# Patient Record
Sex: FEMALE | Race: WHITE | NOT HISPANIC OR LATINO | Employment: OTHER | URBAN - NONMETROPOLITAN AREA
[De-identification: names, ages, dates, MRNs, and addresses within clinical notes are randomized per-mention and may not be internally consistent; named-entity substitution may affect disease eponyms.]

---

## 2023-07-17 ENCOUNTER — OFFICE VISIT (OUTPATIENT)
Dept: PRIMARY CARE | Facility: CLINIC | Age: 73
End: 2023-07-17
Payer: MEDICARE

## 2023-07-17 VITALS
DIASTOLIC BLOOD PRESSURE: 72 MMHG | SYSTOLIC BLOOD PRESSURE: 114 MMHG | WEIGHT: 146 LBS | HEART RATE: 62 BPM | OXYGEN SATURATION: 97 % | BODY MASS INDEX: 23.57 KG/M2

## 2023-07-17 DIAGNOSIS — S76.012A STRAIN OF GLUTEUS MEDIUS OF LEFT LOWER EXTREMITY, INITIAL ENCOUNTER: Primary | ICD-10-CM

## 2023-07-17 DIAGNOSIS — M25.552 LEFT HIP PAIN: ICD-10-CM

## 2023-07-17 PROCEDURE — 99214 OFFICE O/P EST MOD 30 MIN: CPT | Performed by: FAMILY MEDICINE

## 2023-07-17 PROCEDURE — 1036F TOBACCO NON-USER: CPT | Performed by: FAMILY MEDICINE

## 2023-07-17 PROCEDURE — 1160F RVW MEDS BY RX/DR IN RCRD: CPT | Performed by: FAMILY MEDICINE

## 2023-07-17 PROCEDURE — 1159F MED LIST DOCD IN RCRD: CPT | Performed by: FAMILY MEDICINE

## 2023-07-17 PROCEDURE — 1157F ADVNC CARE PLAN IN RCRD: CPT | Performed by: FAMILY MEDICINE

## 2023-07-17 PROCEDURE — 1125F AMNT PAIN NOTED PAIN PRSNT: CPT | Performed by: FAMILY MEDICINE

## 2023-07-17 RX ORDER — DILTIAZEM HYDROCHLORIDE 120 MG/1
120 CAPSULE, COATED, EXTENDED RELEASE ORAL DAILY
COMMUNITY
Start: 2022-07-26

## 2023-07-17 RX ORDER — LOSARTAN POTASSIUM 50 MG/1
1 TABLET ORAL DAILY
COMMUNITY
Start: 2022-11-10 | End: 2023-11-07 | Stop reason: SDUPTHER

## 2023-07-17 ASSESSMENT — PATIENT HEALTH QUESTIONNAIRE - PHQ9
1. LITTLE INTEREST OR PLEASURE IN DOING THINGS: NOT AT ALL
SUM OF ALL RESPONSES TO PHQ9 QUESTIONS 1 AND 2: 0
2. FEELING DOWN, DEPRESSED OR HOPELESS: NOT AT ALL

## 2023-07-17 NOTE — PROGRESS NOTES
Subjective   Patient ID: Dawna Santoyo is a 73 y.o. female who presents for LT hip pain (For 1 month).    HPI   Hip pain  Has had pain off and on over yrs  Ok walking  Stairs a problem   No Injury but in the process of moving and walking up and down steps 15-20 x day and driving back and forth between [place]   Has most pain while sitting particularly driving prolonged sitting  Also hurts going up steps    Review of Systems    Objective   /72   Pulse 62   Wt 66.2 kg (146 lb)   SpO2 97%   BMI 23.57 kg/m²     Physical Exam  A and O x 3 NAD   Negative straight leg raise can go up past 90 degrees without pain  Pain mid gluteus area to lower aspect / upper inner thigh  + pain resisted ext inner rotation hip    Pain passive ext rotation    Assessment/Plan   Problem List Items Addressed This Visit       Left hip pain    Strain of gluteus medius of left lower extremity - Primary   Patient was given a handout regarding gluteal muscle strain.  Involved portion with physical therapy activity she can perform at home to help of note she [place] in a very remote area in [place] now will not be back here for any significant length of time.  This would make physical therapy very difficult.   Extremely active particularly for age.  Encourage rest patient states she will do her best but plans to remain quite active.  Discussed intermittent use of anti-inflammatory/Advil or Aleve.  Reviewed x-ray with patient present do not see significant degenerative joint disease or other abnormality such as cam defect.

## 2023-11-07 DIAGNOSIS — I10 PRIMARY HYPERTENSION: Primary | ICD-10-CM

## 2023-11-07 RX ORDER — LOSARTAN POTASSIUM 50 MG/1
50 TABLET ORAL DAILY
Qty: 30 TABLET | Refills: 0 | Status: SHIPPED | OUTPATIENT
Start: 2023-11-07 | End: 2023-12-07

## 2024-01-22 DIAGNOSIS — R00.2 PALPITATIONS: Primary | ICD-10-CM

## 2024-01-22 NOTE — PROGRESS NOTES
Patient called with recurrent palpitations since having Covid recently. She is currently living in [place] and would like to be referred to Dr Aguila Bardales in [place]. Referral sent.